# Patient Record
Sex: MALE | Race: BLACK OR AFRICAN AMERICAN | NOT HISPANIC OR LATINO | URBAN - METROPOLITAN AREA
[De-identification: names, ages, dates, MRNs, and addresses within clinical notes are randomized per-mention and may not be internally consistent; named-entity substitution may affect disease eponyms.]

---

## 2017-06-29 ENCOUNTER — EMERGENCY (EMERGENCY)
Facility: HOSPITAL | Age: 27
LOS: 1 days | Discharge: HOME | End: 2017-06-29
Attending: EMERGENCY MEDICINE | Admitting: EMERGENCY MEDICINE
Payer: COMMERCIAL

## 2017-06-29 VITALS
WEIGHT: 214.95 LBS | RESPIRATION RATE: 16 BRPM | OXYGEN SATURATION: 97 % | TEMPERATURE: 98 F | SYSTOLIC BLOOD PRESSURE: 130 MMHG | DIASTOLIC BLOOD PRESSURE: 84 MMHG | HEART RATE: 79 BPM

## 2017-06-29 DIAGNOSIS — M54.5 LOW BACK PAIN: ICD-10-CM

## 2017-06-29 PROCEDURE — 72100 X-RAY EXAM L-S SPINE 2/3 VWS: CPT | Mod: 26

## 2017-06-29 PROCEDURE — 99284 EMERGENCY DEPT VISIT MOD MDM: CPT | Mod: 25

## 2017-06-29 PROCEDURE — 99284 EMERGENCY DEPT VISIT MOD MDM: CPT

## 2017-06-29 PROCEDURE — 96374 THER/PROPH/DIAG INJ IV PUSH: CPT

## 2017-06-29 PROCEDURE — 72100 X-RAY EXAM L-S SPINE 2/3 VWS: CPT

## 2017-06-29 RX ORDER — CYCLOBENZAPRINE HYDROCHLORIDE 10 MG/1
1 TABLET, FILM COATED ORAL
Qty: 28 | Refills: 0 | OUTPATIENT
Start: 2017-06-29 | End: 2017-07-13

## 2017-06-29 RX ORDER — KETOROLAC TROMETHAMINE 30 MG/ML
30 SYRINGE (ML) INJECTION ONCE
Qty: 0 | Refills: 0 | Status: DISCONTINUED | OUTPATIENT
Start: 2017-06-29 | End: 2017-06-29

## 2017-06-29 RX ORDER — DIAZEPAM 5 MG
10 TABLET ORAL ONCE
Qty: 0 | Refills: 0 | Status: DISCONTINUED | OUTPATIENT
Start: 2017-06-29 | End: 2017-06-29

## 2017-06-29 RX ADMIN — Medication 10 MILLIGRAM(S): at 17:13

## 2017-06-29 RX ADMIN — Medication 30 MILLIGRAM(S): at 17:13
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
## 2017-06-29 NOTE — ED PROVIDER NOTE - PLAN OF CARE
You probably have a muscle sprain of your lower back without any serious nerve injury. Please take Naproxen and Flexeril as needed fro 2 weeks and follow up with a PCP (MONROE) within 1-2 weeks of discharge. If lower pain is worsening and you are having lower extremities weakness. numbness, unable to control your bladder and bowel, intense fever or chills, please come back emergently to the ED.

## 2017-06-29 NOTE — ED ADULT TRIAGE NOTE - CHIEF COMPLAINT QUOTE
Pt c/o lower back pain radiating R leg after "I was trying to lift ironing rack and something happened in my back."

## 2017-06-29 NOTE — ED PROVIDER NOTE - CARE PLAN
Principal Discharge DX:	Back pain without sciatica  Instructions for follow-up, activity and diet:	You probably have a muscle sprain of your lower back without any serious nerve injury. Please take Naproxen and Flexeril as needed fro 2 weeks and follow up with a PCP (MONROE) within 1-2 weeks of discharge. If lower pain is worsening and you are having lower extremities weakness. numbness, unable to control your bladder and bowel, intense fever or chills, please come back emergently to the ED.

## 2017-06-29 NOTE — ED PROVIDER NOTE - MEDICAL DECISION MAKING DETAILS
Ordered for Valium and Toradol. Will obtain lumbar xrays. Ordered for Valium and Toradol. Will obtain lumbar xrays.  Xray of the lumbar spine unremarkable. Back pain likely 2/2 musculoskeletal in nature. Stable for discharge home on trial of Naproxen and Flexeril for 2 weeks.

## 2017-06-29 NOTE — ED PROVIDER NOTE - ATTENDING CONTRIBUTION TO CARE
26 yo male with worsening lower back pain with mild radiation down RLE x 2 days no weakness no hx of bowel or bladder incontinence - no fevers-  no hx of recent trauma or falls- works doing maintenance- [employer]   DTRs 2 + Bilaterally r=l  no foot frop EHL 5/5 R=L  neg SLR  no sensory deficit- no midline low back tenderness - LS spine - spasm  straightening noted - clinically improved with valium and toradol in ED improved gait rec close follow up for recheck with [facility] in 1 week- may need MRI to further assess for etiology including HNP--rec rest leg elevation and flexerril and nsaids for now

## 2018-10-29 ENCOUNTER — EMERGENCY (EMERGENCY)
Facility: HOSPITAL | Age: 28
LOS: 1 days | Discharge: HOME | End: 2018-10-29
Admitting: EMERGENCY MEDICINE
Payer: SELF-PAY

## 2018-10-29 VITALS
DIASTOLIC BLOOD PRESSURE: 76 MMHG | OXYGEN SATURATION: 97 % | WEIGHT: 230.82 LBS | SYSTOLIC BLOOD PRESSURE: 130 MMHG | RESPIRATION RATE: 17 BRPM | TEMPERATURE: 99 F | HEART RATE: 76 BPM

## 2018-10-29 DIAGNOSIS — M25.561 PAIN IN RIGHT KNEE: ICD-10-CM

## 2018-10-29 DIAGNOSIS — Z79.1 LONG TERM (CURRENT) USE OF NON-STEROIDAL ANTI-INFLAMMATORIES (NSAID): ICD-10-CM

## 2018-10-29 DIAGNOSIS — Z79.899 OTHER LONG TERM (CURRENT) DRUG THERAPY: ICD-10-CM

## 2018-10-29 PROCEDURE — 99283 EMERGENCY DEPT VISIT LOW MDM: CPT

## 2018-10-29 RX ORDER — IBUPROFEN 200 MG
1 TABLET ORAL
Qty: 21 | Refills: 0 | OUTPATIENT
Start: 2018-10-29 | End: 2018-11-04

## 2018-10-29 RX ORDER — IBUPROFEN 200 MG
600 TABLET ORAL ONCE
Qty: 0 | Refills: 0 | Status: COMPLETED | OUTPATIENT
Start: 2018-10-29 | End: 2018-10-29

## 2018-10-29 RX ADMIN — Medication 600 MILLIGRAM(S): at 12:35
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
## 2018-10-29 NOTE — ED PROVIDER NOTE - MEDICAL DECISION MAKING DETAILS
Pt with right knee pain. no swelling, mild ttp on anterior compartment, no laxity, or skin breaks. reflex intact, good rom. able to ambulating without abnormal gait. pain treated. pt advised to fu with ortho for possible ligament injury. referral given.
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
## 2018-10-29 NOTE — ED ADULT NURSE NOTE - NSIMPLEMENTINTERV_GEN_ALL_ED
Implemented All Universal Safety Interventions:  [place] to call system. Call bell, personal items and telephone within reach. Instruct patient to call for assistance. Room bathroom lighting operational. Non-slip footwear when patient is off stretcher. Physically safe environment: no spills, clutter or unnecessary equipment. Stretcher in lowest position, wheels locked, appropriate side rails in place.

## 2020-01-17 NOTE — ED PROVIDER NOTE - DISCHARGE DATE
Z Plasty Text: The lesion was extirpated to the level of the fat with a #15 scalpel blade.  Given the location of the defect, shape of the defect and the proximity to free margins a Z-plasty was deemed most appropriate for repair.  Using a sterile surgical marker, the appropriate transposition arms of the Z-plasty were drawn incorporating the defect and placing the expected incisions within the relaxed skin tension lines where possible.    The area thus outlined was incised deep to adipose tissue with a #15 scalpel blade.  The skin margins were undermined to an appropriate distance in all directions utilizing iris scissors.  The opposing transposition arms were then transposed into place in opposite direction and anchored with interrupted buried subcutaneous sutures. 29-Jun-2017

## 2020-07-31 ENCOUNTER — EMERGENCY (EMERGENCY)
Facility: HOSPITAL | Age: 30
LOS: 1 days | Discharge: HOME | End: 2020-07-31
Attending: EMERGENCY MEDICINE | Admitting: EMERGENCY MEDICINE
Payer: COMMERCIAL

## 2020-07-31 VITALS
RESPIRATION RATE: 18 BRPM | HEART RATE: 70 BPM | SYSTOLIC BLOOD PRESSURE: 155 MMHG | TEMPERATURE: 98 F | WEIGHT: 214.95 LBS | DIASTOLIC BLOOD PRESSURE: 74 MMHG | OXYGEN SATURATION: 98 %

## 2020-07-31 PROCEDURE — 99285 EMERGENCY DEPT VISIT HI MDM: CPT

## 2020-07-31 PROCEDURE — 71250 CT THORAX DX C-: CPT

## 2020-07-31 PROCEDURE — 72125 CT NECK SPINE W/O DYE: CPT

## 2020-07-31 PROCEDURE — 72125 CT NECK SPINE W/O DYE: CPT | Mod: 26

## 2020-07-31 PROCEDURE — 73100 X-RAY EXAM OF WRIST: CPT | Mod: 26,RT

## 2020-07-31 PROCEDURE — 70450 CT HEAD/BRAIN W/O DYE: CPT | Mod: 26

## 2020-07-31 PROCEDURE — 73030 X-RAY EXAM OF SHOULDER: CPT | Mod: 26,LT

## 2020-07-31 PROCEDURE — 73100 X-RAY EXAM OF WRIST: CPT

## 2020-07-31 PROCEDURE — 73030 X-RAY EXAM OF SHOULDER: CPT

## 2020-07-31 PROCEDURE — 71250 CT THORAX DX C-: CPT | Mod: 26

## 2020-07-31 PROCEDURE — 99284 EMERGENCY DEPT VISIT MOD MDM: CPT | Mod: 25

## 2020-07-31 PROCEDURE — 70450 CT HEAD/BRAIN W/O DYE: CPT

## 2020-07-31 RX ORDER — ACETAMINOPHEN 500 MG
650 TABLET ORAL ONCE
Refills: 0 | Status: COMPLETED | OUTPATIENT
Start: 2020-07-31 | End: 2020-07-31

## 2020-07-31 RX ADMIN — Medication 650 MILLIGRAM(S): at 01:30

## 2020-07-31 RX ADMIN — Medication 650 MILLIGRAM(S): at 02:30

## 2020-07-31 NOTE — ED PROVIDER NOTE - PATIENT PORTAL LINK FT
You can access the FollowMyHealth Patient Portal offered by [facility] by registering at the following website: [email or web address]. By joining [employer]’s FollowMyHealth portal, you will also be able to view your health information using other applications (apps) compatible with our system.
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
## 2020-08-02 ENCOUNTER — EMERGENCY (EMERGENCY)
Facility: HOSPITAL | Age: 30
LOS: 1 days | Discharge: HOME | End: 2020-08-02
Attending: EMERGENCY MEDICINE | Admitting: EMERGENCY MEDICINE
Payer: COMMERCIAL

## 2020-08-02 VITALS
HEART RATE: 91 BPM | TEMPERATURE: 98 F | RESPIRATION RATE: 16 BRPM | HEIGHT: 73 IN | SYSTOLIC BLOOD PRESSURE: 137 MMHG | DIASTOLIC BLOOD PRESSURE: 81 MMHG | OXYGEN SATURATION: 99 % | WEIGHT: 214.95 LBS

## 2020-08-02 PROCEDURE — 99284 EMERGENCY DEPT VISIT MOD MDM: CPT

## 2020-08-02 PROCEDURE — 73030 X-RAY EXAM OF SHOULDER: CPT | Mod: 26

## 2020-08-02 PROCEDURE — 99283 EMERGENCY DEPT VISIT LOW MDM: CPT

## 2020-08-02 PROCEDURE — 73030 X-RAY EXAM OF SHOULDER: CPT

## 2020-08-02 NOTE — ED PROVIDER NOTE - MUSCULOSKELETAL, MLM
left shoulder +prominent AC joint with tenderness in area, limited ROM with abduction/external rotation 2/2 pain, strength 5/5, sensation intact distally, radial pulse 2+
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
## 2020-08-02 NOTE — ED PROVIDER NOTE - CARE PROVIDER_API CALL
Naun Tovar  ORTHOPAEDIC SURGERY  [address] [address]  Phone: [phone]  Fax: [phone]  Follow Up Time:     Shorty Morgan  ORTHOPAEDIC SURGERY  [address] [place] [id number]  Phone: [phone]  Fax: [phone]  Follow Up Time:     Dominik Ly  ORTHOPAEDIC SURGERY  [address] [address]  Phone: [phone]  Fax: [phone]  Follow Up Time:
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
## 2020-08-02 NOTE — ED PROVIDER NOTE - CARE PROVIDERS DIRECT ADDRESSES
,DirectAddress_Unknown,DirectAddress_Unknown,[email or web address]

## 2020-08-02 NOTE — ED PROVIDER NOTE - NSFOLLOWUPINSTRUCTIONS_ED_ALL_ED_FT
Acromioclavicular Separation    WHAT YOU NEED TO KNOW:    An acromioclavicular separation (AS), or shoulder separation, is when your shoulder and collarbone move or come apart. The bones move or come apart because the ligaments that hold the bones in place are stretched or torn.    DISCHARGE INSTRUCTIONS:    Medicines: You may need any of the following:     Acetaminophen decreases pain and is available without a doctor's order. Ask how much to take and how often to take it. Follow directions. Acetaminophen can cause liver damage if not taken correctly.      NSAIDs help decrease swelling and pain. This medicine is available with or without a doctor's order. NSAIDs can cause stomach bleeding or kidney problems in certain people. If you take blood thinner medicine, always ask your healthcare provider if NSAIDs are safe for you. Always read the medicine label and follow directions.      A Tetanus (Td) vaccine may be needed if you have an open wound. This vaccine is a booster shot used to help prevent diphtheria and tetanus.      Take your medicine as directed. Contact your healthcare provider if you think your medicine is not helping or if you have side effects. Tell him if you are allergic to any medicine. Keep a list of the medicines, vitamins, and herbs you take. Include the amounts, and when and why you take them. Bring the list or the pill bottles to follow-up visits. Carry your medicine list with you in case of an emergency.    Apply ice: Apply ice on your shoulder for 15 to 20 minutes every hour for the first 1 to 2 days. Use an ice pack, or put crushed ice in a plastic bag. Cover it with a towel. Ice helps prevent tissue damage and decreases swelling and pain.    Apply heat: Apply heat on your shoulder for 20 to 30 minutes every 2 hours after the first 1 to 2 days. Heat helps decrease pain and muscle spasms.    Wear your support device: You may need to wear a strap, elastic bandage, or sling. These devices keep your shoulder in the correct position so it can heal.     Wear the strap or sling constantly for 6 to 8 weeks, even when you sleep. You may remove the strap or sling when you bathe. Do not move your shoulder or arm when the strap or sling is off. Do not lift your arm.      The strap or sling must be tightened by another person every day. Tighten it enough to keep your shoulders back in the correct posture. Tell the person to allow enough room to fit an index finger between your body and the strap. Put a folded wash cloth in your armpit to prevent pressure on the nerves by the strap. Loosen the strap if you feel numbness or tingling in your arm or hand.    Rest your shoulder: Rest your shoulder as much as possible to decrease swelling and help it heal.     Follow up with your healthcare provider as directed: Write down your questions so you remember to ask them during your visits.     Contact your healthcare provider if:     You have a fever.      You have worse pain, even after you take medicine.      You have an open wound that is red, swollen, or draining pus.      Your arm or hand becomes numb or tingles.      You have questions or concerns about your condition or care.    Return to the emergency department if:     You lose feeling in your arm or hand.      You cannot move your arm or hand.
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
## 2020-08-02 NOTE — ED PROVIDER NOTE - PROVIDER TOKENS
PROVIDER:[TOKEN:[[id number]]],PROVIDER:[TOKEN:[[id number]]],PROVIDER:[TOKEN:[[id number]]]

## 2020-08-02 NOTE — ED PROVIDER NOTE - PATIENT PORTAL LINK FT
You can access the FollowMyHealth Patient Portal offered by [facility] by registering at the following website: [email or web address]. By joining [employer]’s FollowMyHealth portal, you will also be able to view your health information using other applications (apps) compatible with our system.

## 2020-08-02 NOTE — ED ADULT NURSE NOTE - NSIMPLEMENTINTERV_GEN_ALL_ED
Implemented All Universal Safety Interventions:  [place] to call system. Call bell, personal items and telephone within reach. Instruct patient to call for assistance. Room bathroom lighting operational. Non-slip footwear when patient is off stretcher. Physically safe environment: no spills, clutter or unnecessary equipment. Stretcher in lowest position, wheels locked, appropriate side rails in place.

## 2020-08-02 NOTE — ED ADULT NURSE NOTE - CHPI ED NUR SYMPTOMS NEG
no deformity/no bruising/no weakness/no fever/no numbness/no tingling/no difficulty bearing weight/no back pain/no abrasion
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
## 2020-08-04 DIAGNOSIS — M25.512 PAIN IN LEFT SHOULDER: ICD-10-CM
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
## 2020-08-04 DIAGNOSIS — S09.90XA UNSPECIFIED INJURY OF HEAD, INITIAL ENCOUNTER: ICD-10-CM
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
## 2020-08-05 DIAGNOSIS — S43.102D UNSPECIFIED DISLOCATION OF LEFT ACROMIOCLAVICULAR JOINT, SUBSEQUENT ENCOUNTER: ICD-10-CM
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
## 2020-08-08 ENCOUNTER — OUTPATIENT (OUTPATIENT)
Dept: OUTPATIENT SERVICES | Facility: HOSPITAL | Age: 30
LOS: 1 days | End: 2020-08-08
Payer: COMMERCIAL

## 2020-08-08 DIAGNOSIS — Z01.818 ENCOUNTER FOR OTHER PREPROCEDURAL EXAMINATION: ICD-10-CM

## 2020-08-08 LAB
APPEARANCE UR: CLEAR — SIGNIFICANT CHANGE UP
APTT BLD: 37.5 SEC — HIGH (ref 27.5–35.5)
BILIRUB UR-MCNC: NEGATIVE — SIGNIFICANT CHANGE UP
COLOR SPEC: YELLOW — SIGNIFICANT CHANGE UP
DIFF PNL FLD: NEGATIVE — SIGNIFICANT CHANGE UP
GLUCOSE UR QL: NEGATIVE — SIGNIFICANT CHANGE UP
HCT VFR BLD CALC: 43 % — SIGNIFICANT CHANGE UP (ref 39–50)
HGB BLD-MCNC: 14 G/DL — SIGNIFICANT CHANGE UP (ref 13–17)
INR BLD: 0.97 — SIGNIFICANT CHANGE UP (ref 0.88–1.16)
KETONES UR-MCNC: ABNORMAL MG/DL
LEUKOCYTE ESTERASE UR-ACNC: NEGATIVE — SIGNIFICANT CHANGE UP
MCHC RBC-ENTMCNC: 32.6 GM/DL — SIGNIFICANT CHANGE UP (ref 32–36)
MCHC RBC-ENTMCNC: 32.6 PG — SIGNIFICANT CHANGE UP (ref 27–34)
MCV RBC AUTO: 100.2 FL — HIGH (ref 80–100)
NITRITE UR-MCNC: NEGATIVE — SIGNIFICANT CHANGE UP
NRBC # BLD: 0 /100 WBCS — SIGNIFICANT CHANGE UP (ref 0–0)
PH UR: 6 — SIGNIFICANT CHANGE UP (ref 5–8)
PLATELET # BLD AUTO: 185 K/UL — SIGNIFICANT CHANGE UP (ref 150–400)
PROT UR-MCNC: NEGATIVE MG/DL — SIGNIFICANT CHANGE UP
PROTHROM AB SERPL-ACNC: 11.6 SEC — SIGNIFICANT CHANGE UP (ref 10.6–13.6)
RBC # BLD: 4.29 M/UL — SIGNIFICANT CHANGE UP (ref 4.2–5.8)
RBC # FLD: 12.4 % — SIGNIFICANT CHANGE UP (ref 10.3–14.5)
SP GR SPEC: 1.02 — SIGNIFICANT CHANGE UP (ref 1–1.03)
UROBILINOGEN FLD QL: 1 E.U./DL — SIGNIFICANT CHANGE UP
WBC # BLD: 4.95 K/UL — SIGNIFICANT CHANGE UP (ref 3.8–10.5)
WBC # FLD AUTO: 4.95 K/UL — SIGNIFICANT CHANGE UP (ref 3.8–10.5)

## 2020-08-08 PROCEDURE — 85610 PROTHROMBIN TIME: CPT

## 2020-08-08 PROCEDURE — 85027 COMPLETE CBC AUTOMATED: CPT

## 2020-08-08 PROCEDURE — 81003 URINALYSIS AUTO W/O SCOPE: CPT

## 2020-08-08 PROCEDURE — 85730 THROMBOPLASTIN TIME PARTIAL: CPT

## 2020-08-11 ENCOUNTER — OUTPATIENT (OUTPATIENT)
Dept: OUTPATIENT SERVICES | Facility: HOSPITAL | Age: 30
LOS: 1 days | Discharge: HOME | End: 2020-08-11

## 2020-08-15 DIAGNOSIS — S43.101A UNSPECIFIED DISLOCATION OF RIGHT ACROMIOCLAVICULAR JOINT, INITIAL ENCOUNTER: ICD-10-CM
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
## 2021-03-13 ENCOUNTER — EMERGENCY (EMERGENCY)
Facility: HOSPITAL | Age: 31
LOS: 1 days | Discharge: HOME | End: 2021-03-13
Admitting: EMERGENCY MEDICINE
Payer: COMMERCIAL

## 2021-03-13 VITALS
DIASTOLIC BLOOD PRESSURE: 85 MMHG | TEMPERATURE: 98 F | RESPIRATION RATE: 17 BRPM | HEART RATE: 85 BPM | SYSTOLIC BLOOD PRESSURE: 150 MMHG | OXYGEN SATURATION: 99 % | HEIGHT: 73 IN

## 2021-03-13 DIAGNOSIS — Z20.822 CONTACT WITH AND (SUSPECTED) EXPOSURE TO COVID-19: ICD-10-CM

## 2021-03-13 LAB — SARS-COV-2 RNA SPEC QL NAA+PROBE: DETECTED

## 2021-03-13 PROCEDURE — U0005: CPT

## 2021-03-13 PROCEDURE — 99282 EMERGENCY DEPT VISIT SF MDM: CPT

## 2021-03-13 PROCEDURE — 99283 EMERGENCY DEPT VISIT LOW MDM: CPT

## 2021-03-13 PROCEDURE — U0003: CPT

## 2021-03-13 NOTE — ED PROVIDER NOTE - PATIENT PORTAL LINK FT
You can access the FollowMyHealth Patient Portal offered by [facility] by registering at the following website: [email or web address]. By joining [employer]’s FollowMyHealth portal, you will also be able to view your health information using other applications (apps) compatible with our system.

## 2021-03-13 NOTE — ED PROVIDER NOTE - OBJECTIVE STATEMENT
Patient is presenting to the Emergency Department with a request to have COVID-19 testing. Pt reports a close covid positive contact   Denies symptoms, including cough, shortness of breath, fever, chills, bodyaches or sore throat.

## 2021-03-25 ENCOUNTER — EMERGENCY (EMERGENCY)
Facility: HOSPITAL | Age: 31
LOS: 1 days | Discharge: HOME | End: 2021-03-25
Admitting: EMERGENCY MEDICINE
Payer: COMMERCIAL

## 2021-03-25 VITALS
HEART RATE: 85 BPM | TEMPERATURE: 98 F | DIASTOLIC BLOOD PRESSURE: 76 MMHG | HEIGHT: 73 IN | SYSTOLIC BLOOD PRESSURE: 131 MMHG | RESPIRATION RATE: 16 BRPM | OXYGEN SATURATION: 98 %

## 2021-03-25 LAB — SARS-COV-2 RNA SPEC QL NAA+PROBE: SIGNIFICANT CHANGE UP

## 2021-03-25 PROCEDURE — 99282 EMERGENCY DEPT VISIT SF MDM: CPT

## 2021-03-25 PROCEDURE — 99283 EMERGENCY DEPT VISIT LOW MDM: CPT

## 2021-03-25 PROCEDURE — U0005: CPT

## 2021-03-25 PROCEDURE — U0003: CPT

## 2021-03-25 NOTE — ED PROVIDER NOTE - NS ED ATTENDING NAME FT
[Treatment Protocol] : Treatment Protocol [0 - No Distress] : Distress Level: 0 [Date: ____________] : Patient's last distress assessment performed on [unfilled]. [Patient given social work contact information and resource sheet] : Patient was given social work contact information and resource sheet hair [FreeTextEntry1] : Evelyn 7/2018 [de-identified] : Ms. RUPAL ASENCIO is a 63 year old female here for an evaluation of breast cancer. Her oncologic history is as follows:\par \par She underwent routine breast imaging on 2/8/18 which showed right Breast upper outer area of possible new area of microcalcifications. Diagnostic breast imaging on 2/27/18 showed highly suspicious microcalcifications in the right upper outer quadrant Bx recommended. She underwent right breast upper outer quadrant core biopsy on 3/12/18 which showed DCIS cribriform, solid pattern, intermediate grade atypia, necrosis. Microcalcifications  ER >90% Positive,TX 75% Positive. She underwent a breast MRI on 4/9/18 which showed right breast known biopsy-proven DCIS additional nonmass enhancement superior laterally of right breast measuring 3.1 x 2.9 x 2.3 cm. An additional area of focal nonmass enhancement measuring 1.3 x 0.7 cm about 2.1 cm antelateral and inferior within the upper outer quadrant. Within the upper outer quadrant   She underwent right breast 10:00 13 cm FN US guided core bx on 4/23/18 which showed DCIS cribriform, solid pattern intermediate atypia. ER >90 % Positive, TX 75 % Positive. Right Upper Outer quadrant MRI  core biopsy on 4/23  which revealed proliferative fibrocystic changes with microcalcification.\par \par She underwent Right Breast Lumpectomy and multiple margin resections on 5/16/18 which revealed intermediate to high grade DCIS Cribriform pattern size measuring 7.0 mm at 10 o'clock .Margins clear with nonproliferative fibrocystic change with prominent microcalcifications.pTisNx [de-identified] : Ms. RUPAL ASENCIO  is here for a follow up appt for right breast DCIS and is on Anastrazole since 7/2018                                                                                                                            Takes Anastrazole daily, good compliance. She has no aches/pain, hot flashes, vag dryness, excessive fatigue. She is active, no change in energy, wt or appetite. cholesterol f/b PMD- well controlled. She quit smoking and has gained wt since then. She is on intermediate fasting diet and has lost 9 lbs\par mammogram/sono:  6/2019, s/p bx, 6 m right side mammo due in dec\par DEXA- 7/2018: Normal. Cont ca+vit D

## 2021-03-25 NOTE — ED ADULT TRIAGE NOTE - NS ED NURSE DISCH DISPOSITION
Sadi Sol is a 47 y.o. male who presents with the following:  Chief Complaint   Patient presents with    Shoulder Pain       Shoulder Pain    History provided by: Patient with several months of increasing right shoulder pain and patient with suspected rotator cuff pathology bilaterally but worse on the right. Depression   The history is provided by the patient (Patient suffers from PTSD with nightmares and takes prazosin for this.). Pertinent negatives include no chest pain, no abdominal pain, no headaches and no shortness of breath. Hypertension    The history is provided by the patient (Patient suffers from hypertension which is not well controlled at this time but is been without sleep and is in pain. ). Pertinent negatives include no chest pain, no orthopnea, no palpitations, no PND, no malaise/fatigue, no blurred vision, no headaches and no shortness of breath. GERD   The history is provided by the patient (Patient suffers from GERD which is generally Under control with omeprazole daily). Pertinent negatives include no chest pain, no abdominal pain, no headaches and no shortness of breath. No Known Allergies    Current Outpatient Prescriptions   Medication Sig    hydroCHLOROthiazide (HYDRODIURIL) 25 mg tablet Take 1 Tab by mouth daily.  fluticasone (FLONASE) 50 mcg/actuation nasal spray 1 spray each nostril twice daily  Indications: CHRONIC NON-ALLERGIC RHINITIS    ipratropium (ATROVENT) 0.06 % nasal spray 1 [place] by Both Nostrils route four (4) times daily. Indications: Rhinorrhea    promethazine (PHENERGAN) 25 mg tablet Take 1 Tab by mouth every six (6) hours as needed.  ondansetron (ZOFRAN ODT) 4 mg disintegrating tablet Take 1 Tab by mouth every eight (8) hours as needed for Nausea for up to 12 doses.  ergocalciferol (VITAMIN D2) 50,000 unit capsule Take 50,000 Units by mouth every Monday.     metFORMIN (GLUCOPHAGE) 500 mg tablet Take 2 Tabs by mouth two (2) times daily (with meals).  sertraline (ZOLOFT) 50 mg tablet Take 1 Tab by mouth daily.  gabapentin (NEURONTIN) 600 mg tablet Take 1 Tab by mouth three (3) times daily.  capsaicin 0.075 % topical cream Apply  to affected area three (3) times daily.  lancets 23 gauge misc by Does Not Apply route.  methyl salicylate-menthol (BENGAY) 15-10 % topical cream Apply  to affected area three (3) times daily.  omeprazole (PRILOSEC) 20 mg capsule Take 20 mg by mouth daily.  ferrous sulfate 325 mg (65 mg iron) tablet Take  by mouth Daily (before breakfast).  prazosin (MINIPRESS) 2 mg capsule Take 2 mg by mouth nightly.  diclofenac EC (VOLTAREN) 50 mg EC tablet Take  by mouth two (2) times a day. No current facility-administered medications for this visit. Past Medical History:   Diagnosis Date    Anxiety     Back problem     chronic LBP    CAD (coronary artery disease)     pt reports MI x2, hosp 2014, 2015    Depression     Diabetes ([facility] [address]     Glucose intolerance (impaired glucose tolerance)     Hypertension     Joint pain     diffuse    Sleeping difficulties     Weakness     LE>UE       Past Surgical History:   Procedure Laterality Date    [facility]  [facility]       No family history on file. Social History     Social History    Marital status:      Spouse name: N/A    Number of children: N/A    Years of education: N/A     Social History Main Topics    Smoking status: Current Every Day Smoker     Packs/day: 1.00    Smokeless tobacco: Never Used    Alcohol use 25.2 oz/week     42 Cans of beer per week      Comment: beer  6 pack/day ,whiskey 10 per week for 35 years    Drug use: Yes     Special: Marijuana      Comment: couple times week    Sexual activity: Yes     Other Topics Concern    None     Social History Narrative       Review of Systems   Constitutional: Negative for malaise/fatigue and weight loss. HENT: Negative for congestion. Patient does have a great deal of sneezing and postnasal drip. Eyes: Negative for blurred vision and double vision. Respiratory: Negative for shortness of breath. Cardiovascular: Negative for chest pain, palpitations, orthopnea and PND. Gastrointestinal: Negative for abdominal pain. Genitourinary: Negative for frequency and urgency. Musculoskeletal: Positive for joint pain (Mostly now in the right shoulder). Skin: Negative for itching and rash. Neurological: Negative for headaches. Psychiatric/Behavioral: Positive for depression. The patient is not nervous/anxious and does not have insomnia. Visit Vitals    BP (!) 147/97 (BP 1 Location: Left arm, BP Patient Position: Sitting)    Pulse 84    Temp 98.3 °F (36.8 °C) (Oral)    Resp 18    Ht 6' 1\" (1.854 m)    Wt 208 lb 3.2 oz (94.4 kg)    SpO2 99%    BMI 27.47 kg/m2     Physical Exam   Constitutional: He is oriented to person, place, and time and well-developed, well-nourished, and in no distress. HENT:   Head: Normocephalic and atraumatic. Nose: Nose normal.   Mouth/Throat: Oropharynx is clear and moist.   Eyes: Conjunctivae and EOM are normal. Pupils are equal, round, and reactive to light. Neck: Normal range of motion. Neck supple. No JVD present. No tracheal deviation present. No thyromegaly present. Cardiovascular: Normal rate, regular rhythm, normal heart sounds and intact distal pulses. Exam reveals no gallop and no friction rub. No murmur heard. Pulmonary/Chest: Effort normal and breath sounds normal. No respiratory distress. He has no wheezes. He has no rales. He exhibits no tenderness. Abdominal: Soft. Bowel sounds are normal. He exhibits no distension and no mass. There is no tenderness. There is no rebound and no guarding. Musculoskeletal: Normal range of motion. He exhibits tenderness (Patient is tender in the [facility] joint of the right shoulder as well as the [facility] joint and he cannot abduct the arm past 45°.).  He exhibits no edema. Lymphadenopathy:     He has no cervical adenopathy. Neurological: He is alert and oriented to person, place, and time. He has normal reflexes. No cranial nerve deficit. He exhibits normal muscle tone. Gait normal. Coordination normal.   Patient complains of numbness and tingling in both lower extremities from about the groin down   Skin: Skin is warm and dry. No rash noted. No erythema. Psychiatric: Mood, memory, affect and judgment normal.   Vitals reviewed. ICD-10-CM ICD-9-CM    1. Essential hypertension I10 401.9 CBC WITH AUTOMATED DIFF      METABOLIC PANEL, COMPREHENSIVE      VITAMIN D, 1, 25 DIHYDROXY      LIPID PANEL      hydroCHLOROthiazide (HYDRODIURIL) 25 mg tablet   2. Corporo-venous occlusive erectile dysfunction N52.02 607.84 TESTOSTERONE, FREE & TOTAL      PROLACTIN   3. Hypovitaminosis D X31.3 296.0 METABOLIC PANEL, COMPREHENSIVE      VITAMIN D, 1, 25 DIHYDROXY   4. Obstructive sleep apnea syndrome G47.33 327.23    5. Controlled type 2 diabetes mellitus with diabetic polyneuropathy, without long-term current use of insulin ([facility]) E11.42 250.60 CBC WITH AUTOMATED DIFF     501.4 METABOLIC PANEL, COMPREHENSIVE      HEMOGLOBIN A1C WITH EAG      LIPID PANEL      MICROALBUMIN, UR, RAND W/ MICROALBUMIN/CREA RATIO   6. Strain of rotator cuff, right, initial encounter S46.011A 840.4 REFERRAL TO PHYSICAL THERAPY   7. Adhesive capsulitis of right shoulder M75.01 726.0 REFERRAL TO PHYSICAL THERAPY   8. PTSD (post-traumatic stress disorder) F43.10 309.81    9.  Chronic nonseasonal allergic rhinitis due to fungal spores J30.89 477.8 fluticasone (FLONASE) 50 mcg/actuation nasal spray      ipratropium (ATROVENT) 0.06 % nasal spray       Orders Placed This Encounter    CBC WITH AUTOMATED DIFF    METABOLIC PANEL, COMPREHENSIVE    VITAMIN D, 1, 25 DIHYDROXY    HEMOGLOBIN A1C WITH EAG    LIPID PANEL    MICROALBUMIN, UR, RAND W/ MICROALBUMIN/CREA RATIO    TESTOSTERONE, FREE & TOTAL    PROLACTIN    [facility]     Referral Priority:   Routine     Referral Type:   PT/OT/ST     Referral Reason:   Specialty Services Required    hydroCHLOROthiazide (HYDRODIURIL) 25 mg tablet     Sig: Take 1 Tab by mouth daily. Dispense:  90 Tab     Refill:  1    fluticasone (FLONASE) 50 mcg/actuation nasal spray     Si spray each nostril twice daily  Indications: CHRONIC NON-ALLERGIC RHINITIS     Dispense:  1 Bottle     Refill:  12    ipratropium (ATROVENT) 0.06 % nasal spray     [place] by Both Nostrils route four (4) times daily.  Indications: Rhinorrhea     Dispense:  15 mL     Refill:  0       Follow-up Disposition: Not on Yamel Seo MD Discharged

## 2021-03-25 NOTE — ED ADULT TRIAGE NOTE - CHIEF COMPLAINT QUOTE
Pt requesting COVID swab, denies any symptoms. Pt covid+ 14 days ago. Denies fevers, cough, SOB, CP.

## 2021-03-25 NOTE — ED PROVIDER NOTE - PATIENT PORTAL LINK FT
You can access the FollowMyHealth Patient Portal offered by [facility] by registering at the following website: [email or web address]. By joining [employer]’s FollowMyHealth portal, you will also be able to view your health information using other applications (apps) compatible with our system.

## 2021-03-28 DIAGNOSIS — Z79.1 LONG TERM (CURRENT) USE OF NON-STEROIDAL ANTI-INFLAMMATORIES (NSAID): ICD-10-CM

## 2021-03-28 DIAGNOSIS — Z20.822 CONTACT WITH AND (SUSPECTED) EXPOSURE TO COVID-19: ICD-10-CM

## 2021-03-28 DIAGNOSIS — Z79.891 LONG TERM (CURRENT) USE OF OPIATE ANALGESIC: ICD-10-CM

## 2022-08-30 ENCOUNTER — EMERGENCY (EMERGENCY)
Facility: HOSPITAL | Age: 32
LOS: 1 days | Discharge: HOME | End: 2022-08-30
Admitting: EMERGENCY MEDICINE
Payer: COMMERCIAL

## 2022-08-30 VITALS
TEMPERATURE: 98 F | DIASTOLIC BLOOD PRESSURE: 82 MMHG | HEART RATE: 75 BPM | HEIGHT: 73 IN | SYSTOLIC BLOOD PRESSURE: 131 MMHG | WEIGHT: 229.94 LBS | RESPIRATION RATE: 18 BRPM

## 2022-08-30 PROCEDURE — 96372 THER/PROPH/DIAG INJ SC/IM: CPT

## 2022-08-30 PROCEDURE — 73030 X-RAY EXAM OF SHOULDER: CPT

## 2022-08-30 PROCEDURE — 73030 X-RAY EXAM OF SHOULDER: CPT | Mod: 26,RT

## 2022-08-30 PROCEDURE — 99283 EMERGENCY DEPT VISIT LOW MDM: CPT | Mod: 25

## 2022-08-30 PROCEDURE — 99284 EMERGENCY DEPT VISIT MOD MDM: CPT | Mod: 25

## 2022-08-30 RX ORDER — CYCLOBENZAPRINE HYDROCHLORIDE 10 MG/1
10 TABLET, FILM COATED ORAL ONCE
Refills: 0 | Status: COMPLETED | OUTPATIENT
Start: 2022-08-30 | End: 2022-08-30

## 2022-08-30 RX ORDER — CYCLOBENZAPRINE HYDROCHLORIDE 10 MG/1
1 TABLET, FILM COATED ORAL
Qty: 14 | Refills: 0
Start: 2022-08-30

## 2022-08-30 RX ORDER — KETOROLAC TROMETHAMINE 30 MG/ML
30 SYRINGE (ML) INJECTION ONCE
Refills: 0 | Status: DISCONTINUED | OUTPATIENT
Start: 2022-08-30 | End: 2022-08-30

## 2022-08-30 RX ORDER — IBUPROFEN 200 MG
1 TABLET ORAL
Qty: 30 | Refills: 0
Start: 2022-08-30

## 2022-08-30 RX ADMIN — Medication 30 MILLIGRAM(S): at 18:31

## 2022-08-30 RX ADMIN — CYCLOBENZAPRINE HYDROCHLORIDE 10 MILLIGRAM(S): 10 TABLET, FILM COATED ORAL at 18:31

## 2022-08-30 NOTE — ED ADULT TRIAGE NOTE - HEIGHT IN CM

## 2022-08-30 NOTE — ED PROVIDER NOTE - MUSCULOSKELETAL, MLM
Spine appears normal, no midline spine tenderness, +right trapezius tenderness, no swelling, no bony shoulder tenderness on right, +FROM shoulder, strength 5/5, sensation intact distally

## 2022-08-30 NOTE — ED PROVIDER NOTE - OBJECTIVE STATEMENT
31 y/o m presents c/o right shoulder and upper back pain for the past 4 days.  Pt stating his symptoms started while lifting weights and was starting to improve with his step-daughter jumped on his back which exacerbated his pain.  Pt reports pain worse with movement, radiates to his shoulder joint and right neck.  Pt has taken ibuprofen for pain with out significant improvement.  Denies numbness/tingling to ext, weakness, all other ROS negative.

## 2022-08-30 NOTE — ED PROVIDER NOTE - CLINICAL SUMMARY MEDICAL DECISION MAKING FREE TEXT BOX
33 y/o m presents c/o right upper back/shoulder pain x 3 days after lifting.  No neuro deficits, xr right shoulder neg.  Likely muscular injury, given toradol and flexeril in ED, will dc with ibuprofen and flexeril, to f/u with pmd and ortho

## 2022-08-30 NOTE — ED ADULT NURSE NOTE - CHPI ED NUR SYMPTOMS NEG
no anorexia/no bladder dysfunction/no bowel dysfunction/no constipation/no difficulty bearing weight/no fatigue/no numbness/no tingling

## 2022-08-30 NOTE — ED PROVIDER NOTE - NSFOLLOWUPINSTRUCTIONS_ED_ALL_ED_FT
Muscle Strain    WHAT YOU NEED TO KNOW:    A muscle strain is a twist, pull, or tear of a muscle or tendon. A tendon is a strong elastic tissue that connects a muscle to a bone. Signs of a strained muscle include bruising and swelling over the area, pain with movement, and loss of strength.    DISCHARGE INSTRUCTIONS:    Return to the emergency department if:   •You suddenly cannot feel or move your injured muscle.          Call your doctor if:   •Your pain and swelling worsen or do not go away.      •You have questions or concerns about your condition or care.      Medicines: You may need any of the following:  •NSAIDs help decrease swelling and pain or fever. This medicine is available with or without a doctor's order. NSAIDs can cause stomach bleeding or kidney problems in certain people. If you take blood thinner medicine, always ask your healthcare provider if NSAIDs are safe for you. Always read the medicine label and follow directions.      •Muscle relaxers help decrease pain and muscle spasms.      •Take your medicine as directed. Contact your healthcare provider if you think your medicine is not helping or if you have side effects. Tell your provider if you are allergic to any medicine. Keep a list of the medicines, vitamins, and herbs you take. Include the amounts, and when and why you take them. Bring the list or the pill bottles to follow-up visits. Carry your medicine list with you in case of an emergency.      Help a muscle strain heal:   •3 to 7 days after the injury: Use Rest, Ice, Compression, and Elevation (RICE) to help stop bruising and decrease pain and swelling.?Rest your muscle to allow the injury to heal. When the pain decreases, begin normal, slow movements. For mild and moderate muscle strains, you should rest your muscles for about 2 days. If you have a severe muscle strain, you should rest for 10 to 14 days. You may need to use crutches to walk if your muscle strain is in your legs or lower body.      ?Apply ice on the injured area. Use an ice pack, or put crushed ice in a plastic bag. Cover the bag with a towel before you apply it to your skin. Apply ice for 15 to 20 minutes each hour, or as directed.      ?Use compression to decrease swelling. You can wrap an elastic bandage around the area to create compression. The bandage should be tight enough for you to feel support. Do not wrap it too tightly.  How to Wrap an Elastic Bandage           ?Elevate the area above the level of your heart, if possible. Keep the injured muscle raised above your heart if possible. For example, if you have a strain of your lower leg muscle, lie down and prop your leg up on pillows. This helps decrease pain and swelling.  Elevate Leg           •3 to 21 days after your injury: Start to slowly and regularly exercise your strained muscle. This will help it heal. If you feel pain, decrease how hard you are exercising.      •1 to 6 weeks after your injury: Stretch the injured muscle. Stretch the muscle for about 30 seconds. Do this 4 times a day. You may stretch the muscle until you feel a slight pull. Stop stretching if you feel pain.      •2 weeks to 6 months after your injury: The goal of this phase is to return to the activity you were doing before the injury without hurting the muscle again.      •3 weeks to 6 months after your injury: Keep stretching and strengthening your muscles to prevent injury. Slowly increase the time and distance that you exercise. You may still have signs and symptoms of muscle strain 6 months after the injury, even if you do things to help it heal. In this case, you may need surgery on the muscle.      Prevent muscle strains:   •Always wear proper shoes when you play sports. Replace your old running shoes with new ones often if you are a runner. Use special shoe inserts or arch supports to correct leg or foot problems. Ask your healthcare provider for more information on shoe supports.      •Do warm up and cool down exercises. Do stretching exercises before you work out or do sports activities. These exercises will help loosen and decrease stress on your muscles. Cool down and stretch after your workout. Do not stop and rest after a workout without cooling down first.  Warm up and Cool Down            •Keep your muscles strong with strength training exercises. Exercises such as weight lifting and stretching exercises help keep your muscles flexible and strong. A physical therapist or  may help you with these exercises.  Strength Training for Adults           •Slowly start your exercise or sports training program. Follow your healthcare provider's advice on when to start exercising. Slowly increase time, distance, and how often you train. Sudden increases in how often you train may cause you to injure your muscle again.      Follow up with your doctor as directed: Your doctor may suggest that you have a follow-up visit before you go back to your usual activity. Write down your questions so you remember to ask them during your visits.

## 2022-08-30 NOTE — ED ADULT NURSE NOTE - OBJECTIVE STATEMENT
Patient states "I was lifting weights and hurt my upper back and then my daughter jumped on me and it got worse".

## 2022-08-30 NOTE — ED ADULT TRIAGE NOTE - OTHER COMPLAINTS
reports he hurts himself doing weights at the gym and yesterday it got worst when his daughter jumped on his shoulder. Denies any numbness or tingling.

## 2022-08-30 NOTE — ED PROVIDER NOTE - CARE PROVIDER_API CALL
Major Lozano)  Orthopaedic Surgery; Sports Medicine  [address] [place] [address]  Phone: [phone]  Fax: [phone])-  Follow Up Time:

## 2022-08-30 NOTE — ED PROVIDER NOTE - NPI NUMBER (FOR SYSADMIN USE ONLY) :
Pharmacy Medication History  Admission medication history interview status for the 3/26/2020  admission is complete. See EPIC admission navigator for prior to admission medications     Medication history sources: [phone]  Medication history source reliability: Moderate  Adherence assessment: Moderate    Significant changes made to the medication list:        Additional medication history information:     Medication reconciliation completed by provider prior to medication history? No    Time spent in this activity: 5min      Prior to Admission medications    Medication Sig Last Dose Taking? Auth Provider   calcium carbonate (TUMS) 500 MG chewable tablet Take 1 chew tab by mouth every 2 hours as needed for heartburn 3/25/2020 at Unknown time Yes Unknown, Entered By History   PARoxetine (PAXIL) 20 MG tablet Take 20 mg by mouth At Bedtime 3/25/2020 at Unknown time Yes Unknown, Entered By History        [[id number]]

## 2022-08-30 NOTE — ED PROVIDER NOTE - PATIENT PORTAL LINK FT
You can access the FollowMyHealth Patient Portal offered by [facility] by registering at the following website: [email or web address]. By joining [email or web address]’s FollowMyHealth portal, you will also be able to view your health information using other applications (apps) compatible with our system.

## 2022-09-01 DIAGNOSIS — M25.511 PAIN IN RIGHT SHOULDER: ICD-10-CM
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
## 2022-09-01 DIAGNOSIS — M54.6 PAIN IN THORACIC SPINE: ICD-10-CM
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
